# Patient Record
Sex: FEMALE | Race: OTHER | URBAN - METROPOLITAN AREA
[De-identification: names, ages, dates, MRNs, and addresses within clinical notes are randomized per-mention and may not be internally consistent; named-entity substitution may affect disease eponyms.]

---

## 2017-10-13 ENCOUNTER — EMERGENCY (EMERGENCY)
Facility: HOSPITAL | Age: 52
LOS: 1 days | Discharge: HOME | End: 2017-10-13
Attending: EMERGENCY MEDICINE | Admitting: EMERGENCY MEDICINE
Payer: COMMERCIAL

## 2017-10-13 VITALS
RESPIRATION RATE: 18 BRPM | TEMPERATURE: 98 F | OXYGEN SATURATION: 100 % | DIASTOLIC BLOOD PRESSURE: 85 MMHG | HEART RATE: 59 BPM | SYSTOLIC BLOOD PRESSURE: 154 MMHG

## 2017-10-13 PROCEDURE — 70450 CT HEAD/BRAIN W/O DYE: CPT | Mod: 26

## 2017-10-13 PROCEDURE — 99285 EMERGENCY DEPT VISIT HI MDM: CPT

## 2017-10-13 PROCEDURE — 72125 CT NECK SPINE W/O DYE: CPT | Mod: 26

## 2017-10-13 RX ORDER — ACETAMINOPHEN 500 MG
650 TABLET ORAL EVERY 6 HOURS
Qty: 0 | Refills: 0 | Status: DISCONTINUED | OUTPATIENT
Start: 2017-10-13 | End: 2017-10-17

## 2017-10-13 RX ADMIN — Medication 650 MILLIGRAM(S): at 19:33
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
## 2017-10-13 NOTE — ED PROVIDER NOTE - CHPI ED SYMPTOMS NEG
no vomiting/no blurred vision/no dizziness/no weakness/no change in level of consciousness/no seizure/no loss of consciousness/no nausea/no confusion/no syncope
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
## 2017-10-13 NOTE — ED PROVIDER NOTE - NS ED ROS FT
*Constitutional: no fevers, no chills  *Head: + head pain  *Eyes: no eye pain, no blurred vision   *ENMT: no hearing changes, no nasal congestion, no sore throat  *CV: no chest pain, no palpitations, no lightheadedness  *Resp: no shortness of breath, no cough, no wheezing  *GI: no abdominal pain, + nausea, no vomiting, no diarrhea, no constipation  *G/U: no dysuria, no hematuria  *Neuro: + headache, no lightheadedness/dizziness  *MSK: + joint pain, no swelling, no redness  *Skin: no rashes, mild bruising  *Heme/Lymph: no bleeding, no bruising  *Allergic/Immunologic: no environmental allergies, no food allergies, no immunosuppressive disorder   ~ Richy Amaya M.D.
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
## 2017-10-13 NOTE — ED PROVIDER NOTE - PHYSICAL EXAMINATION
*Gen: mildly anxious, AAO*3, well-appearing, well-nourished  *HEENT: NC/AT, no facial asymmetry, TM clear w/ no hemotympanum, MMM, airway patent  *Neck: supple, trachea midline  *CV: RRR, S1/S2 present, no murmurs/rubs/gallops  *Resp: no respiratory distress, LCTAB, no wheezing/rales/rhonchi  *Abd: non-distended, no ecchymosis, soft N/Tx4, no guarding or rigidity  *Neuro: no focal neuro deficits, CN II-XII intact - no motor, coordination, or sensory deficits, able to ambulate w/o difficulty  *Extremities/MSK: no gross deformity, no chest wall tenderness, pelvis stable, PMSx4, mild pain overlying right and left shoulder and left lower leg - was able to ambulate w/o difficulty and has full ROM throughout  *Back: no gross deformity, no midline spinal tenderness  *Skin: no rashes, mild bruising  ~ Richy Amaya M.D. *Gen: mildly anxious, AAO*3, well-appearing, well-nourished  *HEENT: NC/AT, mild TTP overlying vertex, no facial asymmetry, TM clear w/ no hemotympanum, MMM, airway patent  *Neck: supple, trachea midline  *CV: RRR, S1/S2 present, no murmurs/rubs/gallops  *Resp: no respiratory distress, LCTAB, no wheezing/rales/rhonchi  *Abd: non-distended, no ecchymosis, soft N/Tx4, no guarding or rigidity  *Neuro: no focal neuro deficits, CN II-XII intact - no motor, coordination, or sensory deficits, able to ambulate w/o difficulty  *Extremities/MSK: no gross deformity, no chest wall tenderness, pelvis stable, PMSx4, mild pain overlying right and left shoulder and left lower leg - was able to ambulate w/o difficulty and has full ROM throughout  *Back: no gross deformity, no midline spinal tenderness  *Skin: no rashes, mild bruising  ~ Richy Amaya M.D.

## 2021-03-15 PROBLEM — I77.75: Chronic | Status: ACTIVE | Noted: 2017-10-13

## 2021-03-25 ENCOUNTER — APPOINTMENT (OUTPATIENT)
Age: 56
End: 2021-03-25

## 2021-05-10 ENCOUNTER — RESULT REVIEW (OUTPATIENT)
Age: 56
End: 2021-05-10

## 2021-08-20 PROBLEM — Z00.00 ENCOUNTER FOR PREVENTIVE HEALTH EXAMINATION: Status: ACTIVE | Noted: 2021-08-20

## 2021-08-23 ENCOUNTER — APPOINTMENT (OUTPATIENT)
Dept: GASTROENTEROLOGY | Facility: CLINIC | Age: 56
End: 2021-08-23